# Patient Record
Sex: FEMALE | Race: WHITE | Employment: UNEMPLOYED | URBAN - METROPOLITAN AREA
[De-identification: names, ages, dates, MRNs, and addresses within clinical notes are randomized per-mention and may not be internally consistent; named-entity substitution may affect disease eponyms.]

---

## 2017-11-17 ENCOUNTER — HOSPITAL ENCOUNTER (EMERGENCY)
Facility: CLINIC | Age: 57
Discharge: HOME | End: 2017-11-17
Attending: EMERGENCY MEDICINE
Payer: MEDICARE

## 2017-11-17 VITALS
BODY MASS INDEX: 23.05 KG/M2 | RESPIRATION RATE: 16 BRPM | SYSTOLIC BLOOD PRESSURE: 125 MMHG | WEIGHT: 135 LBS | TEMPERATURE: 98.6 F | HEIGHT: 64 IN | DIASTOLIC BLOOD PRESSURE: 83 MMHG | OXYGEN SATURATION: 96 % | HEART RATE: 79 BPM

## 2017-11-17 DIAGNOSIS — R11.2 NON-INTRACTABLE VOMITING WITH NAUSEA, UNSPECIFIED VOMITING TYPE: Primary | ICD-10-CM

## 2017-11-17 PROBLEM — K21.9 GASTROESOPHAGEAL REFLUX DISEASE: Status: ACTIVE | Noted: 2017-01-17

## 2017-11-17 PROBLEM — I73.00 RAYNAUD'S SYNDROME: Status: ACTIVE | Noted: 2017-01-17

## 2017-11-17 PROBLEM — M79.7 FIBROMYALGIA: Status: ACTIVE | Noted: 2017-01-17

## 2017-11-17 PROBLEM — E55.9 VITAMIN D DEFICIENCY: Status: ACTIVE | Noted: 2017-01-17

## 2017-11-17 PROBLEM — I63.9 CEREBROVASCULAR ACCIDENT (CVA) (HCC): Status: ACTIVE | Noted: 2017-01-17

## 2017-11-17 PROBLEM — I34.1 MITRAL VALVE PROLAPSE: Status: ACTIVE | Noted: 2017-01-17

## 2017-11-17 PROBLEM — E78.2 MIXED HYPERLIPIDEMIA: Status: ACTIVE | Noted: 2017-01-17

## 2017-11-17 PROBLEM — F41.9 ANXIETY: Status: ACTIVE | Noted: 2017-01-17

## 2017-11-17 PROBLEM — G62.9 PERIPHERAL NEUROPATHY: Status: ACTIVE | Noted: 2017-01-17

## 2017-11-17 PROBLEM — G47.00 INSOMNIA: Status: ACTIVE | Noted: 2017-01-17

## 2017-11-17 PROCEDURE — 6360000002 HC RX W HCPCS: Performed by: EMERGENCY MEDICINE

## 2017-11-17 PROCEDURE — 99283 EMERGENCY DEPT VISIT LOW MDM: CPT

## 2017-11-17 RX ORDER — ONDANSETRON 4 MG/1
4 TABLET, ORALLY DISINTEGRATING ORAL EVERY 8 HOURS PRN
Qty: 8 TABLET | Refills: 0 | Status: SHIPPED | OUTPATIENT
Start: 2017-11-17

## 2017-11-17 RX ORDER — ONDANSETRON 4 MG/1
4 TABLET, ORALLY DISINTEGRATING ORAL ONCE
Status: COMPLETED | OUTPATIENT
Start: 2017-11-17 | End: 2017-11-17

## 2017-11-17 RX ORDER — GABAPENTIN 600 MG/1
600 TABLET ORAL 3 TIMES DAILY
COMMUNITY

## 2017-11-17 RX ADMIN — ONDANSETRON 4 MG: 4 TABLET, ORALLY DISINTEGRATING ORAL at 00:32

## 2017-11-17 ASSESSMENT — PAIN DESCRIPTION - PAIN TYPE: TYPE: CHRONIC PAIN

## 2017-11-17 ASSESSMENT — PAIN SCALES - GENERAL: PAINLEVEL_OUTOF10: 5

## 2017-11-17 NOTE — ED PROVIDER NOTES
eMERGENCY dEPARTMENT eNCOUnter      Pt Name: Cristi Lawrence  MRN: [id number]  [place] 1960  Date of evaluation: 11/17/2017      CHIEF COMPLAINT       Chief Complaint   Patient presents with    Emesis     withdrawl from benzodiazapines          HISTORY OF PRESENT ILLNESS    Cristi Lawrence is a 62 y.o. female who presents With vomiting. Patient states over last couple 3 days she has been having several episodes of vomiting. She had 2 today. She states that she has not had much to drink, not urinating much, but she is clearly well hydrated on exam.  She denies associated chest pain, shortness of breath or abdominal pain. Patient is a relatively complex history as she is on chronic pain management. She is switched physicians at the beginning of last month secondary to her having a positive drug screen for THC syrup primary not see her. She was given one last prescription I her primary for benzodiazepines on 7 October for 17 days. Her new PCP would not prescribe her any benzodiazepines. He also had taken her off one of her other medicines. She denies any seizure activity or any other complaints. REVIEW OF SYSTEMS       Review of systems are all reviewed and negative except stated above in HPI     [address]    has a past medical history of Anxiety; Arthritis; Fibromyalgia; Hyperlipidemia; Insomnia; Mitral valve prolapse; Neuropathy, peripheral ([address] Raynaud's syndrome; Unspecified cerebral artery occlusion with cerebral infarction; and Vitamin D deficiency. SURGICAL HISTORY      has no past surgical history on file. CURRENT MEDICATIONS       Discharge Medication List as of 11/17/2017  1:17 AM      CONTINUE these medications which have NOT CHANGED    Details   gabapentin (NEURONTIN) 600 MG tablet Take 600 mg by mouth 3 times dailyHistorical Med      tapentadol (NUCYNTA ER) 150 MG TB12 extended release tablet Take 150 mg by mouth every 12 hours . Historical Med HYDROcodone-acetaminophen (NORCO) 5-325 MG per tablet Take 1 tablet by mouth every 6 hours as needed for Pain.      lansoprazole (PREVACID) 15 MG capsule Take 15 mg by mouth daily. fentaNYL (DURAGESIC) 75 MCG/HR Place 1 patch onto the skin every 72 hours. diazepam (VALIUM) 10 MG tablet Take 10 mg by mouth every 6 hours as needed for Anxiety. zaleplon (SONATA) 10 MG capsule Take 10 mg by mouth nightly. ibuprofen (ADVIL;MOTRIN) 800 MG tablet Take 1 tablet by mouth every 8 hours as needed for Pain., Disp-15 tablet, R-0             ALLERGIES     has No Known Allergies. FAMILY HISTORY     has no family status information on file. family history is not on file. SOCIAL HISTORY      reports that she has quit smoking. She has never used smokeless tobacco. She reports that she does not drink alcohol or use drugs. PHYSICAL EXAM     INITIAL VITALS:  height is 5' 4\" (1.626 m) and weight is 61.2 kg (135 lb). Her oral temperature is 98.6 °F (37 °C). Her blood pressure is 125/83 and her pulse is 79. Her respiration is 16 and oxygen saturation is 96%. Gen.: Patient is a middle-aged female who looks older than her stated age, in no acute distress. HEENT: Head is atraumatic. Conjunctiva are clear. Pupils are equal, reactive at 3 mm extraocular muscles are intact without nystagmus. No shows no rhinorrhea. Mouth shows moist mucous membranes. Neck: Supple. No meningismus. Respiratory: Lung sounds are clear bilateral without wheezes or rhonchi. Cardiac: Heart is regular rate and rhythm. GI: Abdomen soft, nontender, good active bowel sounds. Skin: Warm and dry. No rash.   Neuro: Patient is alert and oriented, moves all 4 extremities well    DIFFERENTIAL DIAGNOSIS/ MDM:     Vomiting, anxiety, viral illness    DIAGNOSTIC RESULTS         EMERGENCY DEPARTMENT COURSE:   Vitals:    Vitals:    11/17/17 0012   BP: 125/83   Pulse: 79   Resp: 16   Temp: 98.6 °F (37 °C)   TempSrc: Oral   SpO2: 96% Weight: 61.2 kg (135 lb)   Height: 5' 4\" (1.626 m)     -------------------------  BP: 125/83, Temp: 98.6 °F (37 °C), Pulse: 79, Resp: 16    Orders Placed This Encounter   Medications    ondansetron (ZOFRAN-ODT) disintegrating tablet 4 mg    ondansetron (ZOFRAN ODT) 4 MG disintegrating tablet     Sig: Take 1 tablet by mouth every 8 hours as needed for Nausea     Dispense:  8 tablet     Refill:  0           Re-evaluation Notes    Patient is in no pain. She clearly is well-hydrated. She was given Zofran here held down a bottle of water without difficulty. At this time. I do not feel findings are from benzodiazepines withdrawal.  She is far too far out for that. She does have narcotics at home. I did do an [facility] report, which shows she has 153 morphine equivalent. She will be discharged with prescription for Zofran and follow-up with her primary and return if worse        FINAL IMPRESSION      1. Non-intractable vomiting with nausea, unspecified vomiting type          DISPOSITION/PLAN   DISPOSITION Decision to Discharge    Condition on Disposition    Stable    PATIENT REFERRED TO:  Rene Leonard MD  [address] [place] [address]  [address]  [phone]    In 1 day        DISCHARGE MEDICATIONS:  Discharge Medication List as of 11/17/2017  1:17 AM      START taking these medications    Details   ondansetron (ZOFRAN ODT) 4 MG disintegrating tablet Take 1 tablet by mouth every 8 hours as needed for Nausea, Disp-8 tablet, R-0Print             (Please note that portions of this note were completed with a voice recognition program.  Efforts were made to edit the dictations but occasionally words are mis-transcribed.)    Umaña MD, F.A.C.E.P.   Attending Emergency Physician        Kiana Castaneda MD  11/17/17 0578

## 2017-11-17 NOTE — ED TRIAGE NOTES
Pt to ed via [facility] for complaints of possible withdrawal symptoms. Pt states that she recently changed PCP's and her new PCP would not write for her current medications which included a hypnotic, valium, and a fentanyl patch. Pt was referred to  pain management where in lieu   Of fentanyl the pt was written for nucynta and norco. Pain management states that they would not write for her valium because it was out of their scope of practice. Pt has not had any valium for 3 weeks. Pt states that she has had nausea and emesis for the last week.